# Patient Record
Sex: FEMALE | Race: WHITE | NOT HISPANIC OR LATINO | URBAN - METROPOLITAN AREA
[De-identification: names, ages, dates, MRNs, and addresses within clinical notes are randomized per-mention and may not be internally consistent; named-entity substitution may affect disease eponyms.]

---

## 2017-01-05 ENCOUNTER — OFFICE VISIT - HEALTHEAST (OUTPATIENT)
Dept: BEHAVIORAL HEALTH | Facility: CLINIC | Age: 32
End: 2017-01-05

## 2017-01-05 DIAGNOSIS — F43.23 ADJUSTMENT DISORDER WITH MIXED ANXIETY AND DEPRESSED MOOD: ICD-10-CM

## 2017-01-18 ENCOUNTER — OFFICE VISIT - HEALTHEAST (OUTPATIENT)
Dept: BEHAVIORAL HEALTH | Facility: CLINIC | Age: 32
End: 2017-01-18

## 2017-01-18 DIAGNOSIS — F43.23 ADJUSTMENT DISORDER WITH MIXED ANXIETY AND DEPRESSED MOOD: ICD-10-CM

## 2017-02-03 ENCOUNTER — AMBULATORY - HEALTHEAST (OUTPATIENT)
Dept: BEHAVIORAL HEALTH | Facility: CLINIC | Age: 32
End: 2017-02-03

## 2017-02-03 ENCOUNTER — OFFICE VISIT - HEALTHEAST (OUTPATIENT)
Dept: BEHAVIORAL HEALTH | Facility: CLINIC | Age: 32
End: 2017-02-03

## 2017-02-03 DIAGNOSIS — F43.23 ADJUSTMENT DISORDER WITH MIXED ANXIETY AND DEPRESSED MOOD: ICD-10-CM

## 2017-02-17 ENCOUNTER — OFFICE VISIT - HEALTHEAST (OUTPATIENT)
Dept: BEHAVIORAL HEALTH | Facility: CLINIC | Age: 32
End: 2017-02-17

## 2017-02-17 DIAGNOSIS — F43.23 ADJUSTMENT DISORDER WITH MIXED ANXIETY AND DEPRESSED MOOD: ICD-10-CM

## 2017-03-08 ENCOUNTER — OFFICE VISIT - HEALTHEAST (OUTPATIENT)
Dept: BEHAVIORAL HEALTH | Facility: CLINIC | Age: 32
End: 2017-03-08

## 2017-03-08 DIAGNOSIS — F43.23 ADJUSTMENT DISORDER WITH MIXED ANXIETY AND DEPRESSED MOOD: ICD-10-CM

## 2017-04-11 ENCOUNTER — OFFICE VISIT - HEALTHEAST (OUTPATIENT)
Dept: BEHAVIORAL HEALTH | Facility: CLINIC | Age: 32
End: 2017-04-11

## 2017-04-11 DIAGNOSIS — F43.23 ADJUSTMENT DISORDER WITH MIXED ANXIETY AND DEPRESSED MOOD: ICD-10-CM

## 2017-05-12 ENCOUNTER — OFFICE VISIT - HEALTHEAST (OUTPATIENT)
Dept: BEHAVIORAL HEALTH | Facility: CLINIC | Age: 32
End: 2017-05-12

## 2017-05-12 DIAGNOSIS — F43.23 ADJUSTMENT DISORDER WITH MIXED ANXIETY AND DEPRESSED MOOD: ICD-10-CM

## 2017-05-15 ENCOUNTER — OFFICE VISIT - HEALTHEAST (OUTPATIENT)
Dept: FAMILY MEDICINE | Facility: CLINIC | Age: 32
End: 2017-05-15

## 2017-05-15 ENCOUNTER — HOSPITAL ENCOUNTER (OUTPATIENT)
Dept: LAB | Age: 32
Setting detail: SPECIMEN
Discharge: HOME | End: 2017-05-15

## 2017-05-15 DIAGNOSIS — J32.9 SINUSITIS: ICD-10-CM

## 2017-05-15 DIAGNOSIS — J02.9 SORE THROAT: ICD-10-CM

## 2017-05-15 ASSESSMENT — MIFFLIN-ST. JEOR: SCORE: 2114.96

## 2017-06-01 ENCOUNTER — AMBULATORY - HEALTHEAST (OUTPATIENT)
Dept: BEHAVIORAL HEALTH | Facility: CLINIC | Age: 32
End: 2017-06-01

## 2017-06-01 ENCOUNTER — OFFICE VISIT - HEALTHEAST (OUTPATIENT)
Dept: BEHAVIORAL HEALTH | Facility: CLINIC | Age: 32
End: 2017-06-01

## 2017-06-01 DIAGNOSIS — F43.23 ADJUSTMENT DISORDER WITH MIXED ANXIETY AND DEPRESSED MOOD: ICD-10-CM

## 2017-06-15 ENCOUNTER — OFFICE VISIT - HEALTHEAST (OUTPATIENT)
Dept: BEHAVIORAL HEALTH | Facility: CLINIC | Age: 32
End: 2017-06-15

## 2017-06-15 DIAGNOSIS — F43.23 ADJUSTMENT DISORDER WITH MIXED ANXIETY AND DEPRESSED MOOD: ICD-10-CM

## 2017-07-21 ENCOUNTER — OFFICE VISIT - HEALTHEAST (OUTPATIENT)
Dept: BEHAVIORAL HEALTH | Facility: CLINIC | Age: 32
End: 2017-07-21

## 2017-07-21 DIAGNOSIS — F43.23 ADJUSTMENT DISORDER WITH MIXED ANXIETY AND DEPRESSED MOOD: ICD-10-CM

## 2017-08-24 ENCOUNTER — OFFICE VISIT - HEALTHEAST (OUTPATIENT)
Dept: BEHAVIORAL HEALTH | Facility: CLINIC | Age: 32
End: 2017-08-24

## 2017-08-24 DIAGNOSIS — F43.23 ADJUSTMENT DISORDER WITH MIXED ANXIETY AND DEPRESSED MOOD: ICD-10-CM

## 2017-09-25 ENCOUNTER — OFFICE VISIT - HEALTHEAST (OUTPATIENT)
Dept: FAMILY MEDICINE | Facility: CLINIC | Age: 32
End: 2017-09-25

## 2017-09-25 DIAGNOSIS — J02.0 STREP THROAT: ICD-10-CM

## 2017-09-28 ENCOUNTER — OFFICE VISIT - HEALTHEAST (OUTPATIENT)
Dept: BEHAVIORAL HEALTH | Facility: CLINIC | Age: 32
End: 2017-09-28

## 2017-09-28 ENCOUNTER — AMBULATORY - HEALTHEAST (OUTPATIENT)
Dept: BEHAVIORAL HEALTH | Facility: CLINIC | Age: 32
End: 2017-09-28

## 2017-09-28 DIAGNOSIS — F43.23 ADJUSTMENT DISORDER WITH MIXED ANXIETY AND DEPRESSED MOOD: ICD-10-CM

## 2017-10-26 ENCOUNTER — OFFICE VISIT - HEALTHEAST (OUTPATIENT)
Dept: BEHAVIORAL HEALTH | Facility: CLINIC | Age: 32
End: 2017-10-26

## 2017-10-26 DIAGNOSIS — F43.23 ADJUSTMENT DISORDER WITH MIXED ANXIETY AND DEPRESSED MOOD: ICD-10-CM

## 2017-11-30 ENCOUNTER — OFFICE VISIT - HEALTHEAST (OUTPATIENT)
Dept: BEHAVIORAL HEALTH | Facility: CLINIC | Age: 32
End: 2017-11-30

## 2017-11-30 DIAGNOSIS — F43.23 ADJUSTMENT DISORDER WITH MIXED ANXIETY AND DEPRESSED MOOD: ICD-10-CM

## 2018-01-11 ENCOUNTER — AMBULATORY - HEALTHEAST (OUTPATIENT)
Dept: BEHAVIORAL HEALTH | Facility: CLINIC | Age: 33
End: 2018-01-11

## 2018-01-11 ENCOUNTER — OFFICE VISIT - HEALTHEAST (OUTPATIENT)
Dept: BEHAVIORAL HEALTH | Facility: CLINIC | Age: 33
End: 2018-01-11

## 2018-01-11 DIAGNOSIS — F43.23 ADJUSTMENT DISORDER WITH MIXED ANXIETY AND DEPRESSED MOOD: ICD-10-CM

## 2018-02-15 ENCOUNTER — OFFICE VISIT - HEALTHEAST (OUTPATIENT)
Dept: BEHAVIORAL HEALTH | Facility: CLINIC | Age: 33
End: 2018-02-15

## 2018-02-15 DIAGNOSIS — F43.23 ADJUSTMENT DISORDER WITH MIXED ANXIETY AND DEPRESSED MOOD: ICD-10-CM

## 2018-03-22 ENCOUNTER — OFFICE VISIT - HEALTHEAST (OUTPATIENT)
Dept: BEHAVIORAL HEALTH | Facility: CLINIC | Age: 33
End: 2018-03-22

## 2018-03-22 DIAGNOSIS — F43.23 ADJUSTMENT DISORDER WITH MIXED ANXIETY AND DEPRESSED MOOD: ICD-10-CM

## 2018-04-19 ENCOUNTER — OFFICE VISIT - HEALTHEAST (OUTPATIENT)
Dept: BEHAVIORAL HEALTH | Facility: CLINIC | Age: 33
End: 2018-04-19

## 2018-04-19 DIAGNOSIS — F43.23 ADJUSTMENT DISORDER WITH MIXED ANXIETY AND DEPRESSED MOOD: ICD-10-CM

## 2018-05-22 ENCOUNTER — AMBULATORY - HEALTHEAST (OUTPATIENT)
Dept: BEHAVIORAL HEALTH | Facility: CLINIC | Age: 33
End: 2018-05-22

## 2018-05-22 ENCOUNTER — OFFICE VISIT - HEALTHEAST (OUTPATIENT)
Dept: BEHAVIORAL HEALTH | Facility: CLINIC | Age: 33
End: 2018-05-22

## 2018-05-22 DIAGNOSIS — F43.23 ADJUSTMENT DISORDER WITH MIXED ANXIETY AND DEPRESSED MOOD: ICD-10-CM

## 2021-05-30 VITALS — WEIGHT: 293 LBS | HEIGHT: 68 IN | BODY MASS INDEX: 44.41 KG/M2

## 2021-05-31 VITALS — WEIGHT: 293 LBS | BODY MASS INDEX: 46.76 KG/M2

## 2021-06-08 NOTE — PROGRESS NOTES
"Diagnostic Assessment  [] Brief  [x] Standard    Date(s): 2017  Start Time: 9:12 AM  Stop Time: 10:49 AM    Patient Name: Tanna Live  Age: 31 y.o.    1985        Referral Source: Vero Rudolph CNP  Therapist: BOGDAN Pope        Persons Present: Tanna Live and BOGDAN Pope    Chief Complaint   \"Last spring my brother was murdered.\"    Patient s expectation for treatment  \"I want to try to be less emotional.\"    Sources/references used in completing this assessment:   Face-to-face interview, Patient chart, adult intake questionnaire, and psychological measures listed below:    Psychological Measures:  1. PANSI: Positive ideation score=3.83 <3.4; Negative ideation score= 1>1.6.  Patient denies suicidal thoughts and/or planning and commits to seeking safety if her is unsafe in the community.    2. CAGE Aid= score of 1/4  Patient is not enrolled in chemical dependency program and denies substance use problem; no referral made at this time.  3.  WHODAS: 99, representing a disability percentage of 18.75%.  H1=5, H2&3=0  4. PHQ-9=score of 9 Patient indicates it is somewhat difficult to manage symptoms.  5. CARLYN-7=score of 9 Patient indicates it is somewhat difficult to manage symptoms.    Presenting Problem/History:    Functional Impairments:   Personal: 3  Family: 3  Work: 3  Social:2     How does the presenting problem affect patients daily functioning:    Feels \"outside of it\" and/or \"off\", cries a lot, sometimes can't stop crying, sadness, feels a loss and unable to fix it.    Issues/Stressors:   Brother's disappearance in 2016 and confirmed death in 2016, feels like she should move back to her hometown to be closer to her family to offer more support and to facilitate their questions with the 's investigation, feels there has been a miscarriage of justice, grief & loss, stressful job, her late brother's former girlfriend/mother to her 7-year old " "niece is \"trashing\" her family on Facebook.      Physical Problems: Weight gain, Inability to sleep and Sleeping too much    Social Problems: Reported none.    Behavioral Problems: Excessive work and Subtance abuse    Cognitive Problems: Distractability, Indecisiveness and Disordered thinking    Emotional Problems: Anxious, Angry and Mood swings     Onset/Frequency/Duration presenting problem symptoms:    2016.    How does the patient perceive his/her problem in relation to how others see his/her problem?  She describes a very supportive family and boyfriend who reflect/share understanding.  Her boyfriend encouraged her to seek therapy.    Family/Social History:     Marriages/Significant other   In relationship with a boyfriend of 7 months.  She describes him as \"very supportive.\"    Children   Reported none.    Parents   Her parents are  for 39 years.  Her mother is a nurse and her father is a retired perez.  She describes being close with her parents.      Siblings  Her older brother  at age 45 this past year.  He her biological half-brother, from her father's first marriage.  She has an older sister, age 37.  She is close to her sister, and was also close to her brother.    Climate in family of origin   \"Warm, wonderful, excellent.  We are very close.\"    Education  Graduated from a college preparatory school.  Attended culinary school and is trained as a .    Problems with Learning or School   Reported none.    Developmental factors   Reported meeting all of her developmental milestones.    Significant personal relationships including patient s evaluation of the relationship quality  She describes being very close with her family.  Also, she identifies her boyfriend as \"very supportive.\"  She has long-standing friendships, as well.    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  She lived in a tent during these months.      Sexual/physical/emotional/financial abuse/trauma  Reported none.    Contextual Non-personal factors contributing to the patients concerns   Reported none.    Strengths/personal resources  Direct, intuitive, cares for others, and advocate for loved ones, treated, intelligent, skilled in leadership, guided by a strong sense of fairness.    Weaknesses   Can be very tough on self, high expectations on self.    Support network(s)/Resources  Reported none.    Belief system:    Druze.    Cultural influences and impact on patient  She [place] the [place]. She reflected that her mother is very stoic, and that her parents are of [place] descent. She identifies family/ethnic cultural messages of \"don't get bogged down by the snowstorm\" and that it is important to \"push through inner turmoil.\"    Cultural impact on health and health care   Seeks and adheres to standard [facility] medical care.    Current living situation   He lives in an apartment in an urban residential neighborhood which she describes as safe and comfortable.    Work History  She works in the [employer] business as a , a position she has had for over 2 years.  She also does catering as self employment.    Financial Concerns   Reported none. Has student loans.    Legal Problems   Reported none.    Hobbies/Interests:    Being outside, doing art, being with family and friends      Family Mental Health/Medical History    Family Mental Health:    Reported none.    Family history of Suicide:  Reported none.    Family history Chemical Dependency:    Substance abuse - brother.    Family Medical history:   Reported none.      Patient Medical History    Hospitalizations     Was hospitalized for 2 days in 2012 for dehydration.    Medical diagnoses/concerns:  Reported none.    Current physician/other non psychiatric medical provider s: "    Vero Rudolph CNP                     Date of last medical exam:   December 12, 2016    Current Medications:  Current Outpatient Prescriptions   Medication Sig Dispense Refill     triamcinolone (KENALOG) 0.5 % cream Apply twice a day to affected areas 30 g 0     valACYclovir (VALTREX) 500 MG tablet Take 500 mg by mouth 2 (two) times a day.       No current facility-administered medications for this visit.          Past Mental Health History:    Previous mental health diagnosis:  Reported none.    Date of diagnosis:  Reported none.    Hx of Mental Health Treatment or Services:  Reported none.    BERRY Received:      [] Yes   [x] No      Hx of MH Tx/Hospitalizations   Reported none.    Hx of Psychiatric Medications:  Reported none.      Suicidal/Homicidal Risk Assessment:    Suicidal: None reported  Ideation:None reported.  History of Past Attempt(s): description: Reported none.  Crisis Plan: Crisis plan not developed as patient denies symptoms.    Homicidal: None reported   Ideation:None reported.  History of Aggression towards others: Reported none.  Crisis Plan: Crisis plan not developed as patient denies symptoms.    Self-Injuring Behaviors: Reported none.  History of SIB: Reported none.  Crisis Plan: Crisis plan not developed as patient denies symptoms.    History of destruction to property: Reported none.  Description: Reported none.  Crisis Plan: Crisis plan not developed as patient denies symptoms.      Non- Substance Abuse addictive Behaviors/Compulsive Behaviors:  [] Gambling     [] Sex     [] Pornography    [] Shopping     [] Eating     [] Self-Injury  [] Other           [x] None Reported      Comments:   Reported none.      Chemical Use/Abuse History    CAGE-AID (screening to determine a patients use/abuse/dependency): 1/4      Alcohol:   [] None Reported    [x] Yes   [] No  Type: Beer, wine, or Champagne   Frequency (daily, weekly, occasionally): weekly  Age of first use: 19    Date of last use:  1/1/17          Street Drugs:   [] None Reported    [x] Yes   [] No  Type:marijuana   Frequency (daily, weekly, occasionally): weekly, a small amount  Age of first use: 23    Date of last use: 1/1/17    Prescription Drugs:   [x] None Reported    [] Yes   [] No  Type: Reported none.   Frequency (daily, weekly, occasionally): Reported none.    Tobacco:   [] None Reported    [x] Yes   [] No  Type: Cigarettes  Frequency (daily, weekly, occasionally): Occasionally, identifies as a social smoker.  Age of first use: 23    Date of last use: 1/1/17    Caffeine:   [x] None Reported    [] Yes   [] No  Type: Reported none.   Frequency (daily, weekly, occasionally): Reported none.    Currently in a treatment program:   [] Yes   [x] No      Where: No history of treatment program.    BERRY Received:    [] Yes   [x] No         Collaborative info requested/received:   [] Yes   [x] No      Comments: No history of chemical health problems nor treatment.    History of CD Treatment:      [x] None Reported             Description: See above.    MENTAL STATUS EVALUATION    Grooming: Well groomed  Attire: Appropriate  Age: Appears Stated  Behavior Towards Examiner: Cooperative  Motor Activity: Within normal   Eye Contact: Appropriate  Mood: Sad  Affect: Congruent w/content of speech and Tearful  Speech/Language: Within normal  Attention: Within normal  Concentration: Within normal  Thought Process: Within normal  Thought Content: Hallucinations: Within noraml  Delusions: Within normal  Orientation: X 3  Memory: No Evidence of Impairment  Judgment: No Evidence of Impairment  Estimated Intelligence: Above Average  Demonstrated Insight: Adequate  Fund of Knowledge: adequate      Clinical Impressions/Assessment/Recommendations:     Tanna Live provided background information via the Adult Intake Questionnaire, psychological measures (scores are documented at the beginning of this DA), and face-to-face interview.  [facility]  "[facility] to complete this DA.  The patient was referred to this therapist by Vero Rudolph CNP.      Tanna Live is a pleasant 31 y.o. White or  female presenting to therapy for assistance with grief following her brother's death.  The patient advises her desired outcomes of therapy are \"to not be so emotional\".  Tanna Live indicates her difficulties with sadness and grief began June 2016 when her brother who had been missing since March 2016 was found dead [place] a [place].  The patient has attempted to eliminate or manage these problems by connecting to her family, and experiential avoidance.  The patient reports being unsuccessful in managing her symptoms of depression and anxiety, and expresses an interest in engaging in psychotherapy at this time.      Social stressors: Brother's disappearance in March 2016 and confirmed death in June 2016, living at a distance from her family, dissatisfaction with investigation of her brother's death, her late brother's former girlfriend/mother to her 7-year old niece \"trashing\" of her family on Facebook, stressful job. The patient presents important strengths of intelligence, supportive family, close network of friends, stable employment in her field, leadership skills, value of spiritual britney.     Based on the information gathered in this diagnostic assessment, the patient meets criteria for the following DSM-5 diagnosis, Adjustment Disorder with mixed anxiety and depressed mood, persistent (chronic) given her experience of the development of emotional symptoms in response to the presence of identifiable stressors occurring within 3 months of the onset of the stressors and lasting for over 6 months. Theses symptoms are clinically significant, evidenced by the following: marked distress that is experienced as feeling on a daily basis, worrying about different things, trouble relaxing, fatigue, irritability, indecisiveness, and sleep disturbance. The " stress-related disturbance does not meet the criteria for another mental disorder and is not merely an exacerbation of a preexisting mental disorder, nor does it appear to represent normal bereavement.     Diagnosis:     Adjustment Disorder with mixed anxiety and depressed mood      It is recommended that the patient begin individual psychotherapy with follow-up appointments scheduled weekly or biweekly.      Tanna Live would be best serviced by therapeutic interventions that provide a client centered atmosphere with positive regard.  In addition, the patient may benefit from cognitive behavioral therapies, along with Motivational Interviewing.      Assessment of client resolving presenting mental health concerns:  Ability  [] low     [] average     [x] high  Motivation [] low     [] average     [x] high  Willingness [] low     [] average     [x] high    Initial Therapy Plan     1.. Return to therapy to discuss outcome of diagnostic assessment and create a treatment plan.      2. Develop therapeutic relationship with therapist.      3. CBT and Mindfulness-based approaches, such as ACT for anxiety and depression.    4. Grief counseling, to include connection to other grief support resources, such as therapy groups in the community.      4. Motivational Interviewing to increase client's therapeutic engagement and evoke motivation to make positive change.       Is patient's family involved in the treatment?  [x] No     [] Yes    If yes, How?  Patient is pursuing individual treatment sessions at this time.    If no, Why?  Patient's preference is for individual treatment sessions. Should she want to include a loved one, her change in preference will be honored.      Therapist s Signature:   Performed and documented by MANUEL Colon, [facility].

## 2021-06-08 NOTE — PROGRESS NOTES
"Mental Health Visit Note    2/3/2017    Start time: 1:05 PM    Stop Time: 2:06 PM   Session # 2    Tanna Live is a 31 y.o. female is being seen today for    Chief Complaint   Patient presents with     Follow-up   .     New symptoms or complaints: None    Functional Impairment:   Personal: 4  Family: 2  Work: 4  Social:2    Clinical assessment of mental status: Tanna presented on time for her appointment. She was oriented x3, open and cooperative, and dressed appropriately for this session and weather. Her memory was Normal cognitive functioning . Her speech was within normal. Language was appropriate to discussion. Concentration and focus is within normal. Psychosis is not noted nor reported. Her mood is dysphoric. Affect is congruent with mood, tearful at times, appropriate to subject. Fund of knowledge is adequate. Insight is adequate for therapy.      Suicidal/Homicidal Ideation present: None Reported This Session    Patient's impression of their current status: Patient stated, \"My landlord has given me a 60 day notice to move.  I'm really heartbroken.\"  She really likes where she lives and reflected on how it has been a source of comfort and stability for over 8 years.  She described feeling overwhelmed at the thought of moving at this time, as she continues to grieve her brother's death.  She reflected that she resists feeling her sadness because \"I'm afraid I won't be able to stop crying.\"  She is planning to go on a short vacation to [place] with her girlfriends next week and considered that it will be good to be with her friends.      Therapist impression of patients current state: Patient is engaging in the therapeutic process. Her thoughts, feelings, and beliefs were processed. She is willing to explore strategies for improved symptom management, and was willing to look at the components of wellbeing, particularly fusion/defusion from unhelpful thoughts. She is insightful.    Type of " "psychotherapeutic technique provided: Insight oriented, Client centered, Solution-focused, CBT and grief support    Progress toward short term goals:Progress as expected, patient is receptive to grief support and strategies for symptom management/improvement as she is in an important life adjustment.    Review of long term goals: Treatment Plan completed.    Diagnosis:   1. Adjustment disorder with mixed anxiety and depressed mood        Plan and Follow up: Patient encouraged to maintain healthy routines. Patient agreed to practice noticing her thoughts to build capacity to recognize thought patterns that are not helpful and awareness of thought-feeling connection while identifying and committing to behaviors that improve her mood. Encouraged patient to practice a daily mindfulness breathing exercise.She plans to try using defusion strategies when she notices she is getting stuck in a thought or feeling, to say to herself, \"I am having the thought/feeling that...\" and/or to sing aloud her thoughts, when comfortable, in addition to continuing healthy daily routines.  Provided information about connecting to a grief support group.  Plans to return for follow up in two weeks after returning from her trip.       Discharge Criteria/Planning: Patient will continue with follow-up until therapy can be discontinued without return of signs and symptoms.    Carrie Contreras 2/3/2017      This note was created with help of Dragon dictation software. Grammatical / typing errors are not intentional and inherent to the software.  "

## 2021-06-08 NOTE — PROGRESS NOTES
Mental Health Visit Note    1/18/2017    Start time: 11:11 AM    Stop Time: 12:12 PM   Session # 2    Tanna Live is a 31 y.o. female is being seen today for    Chief Complaint   Patient presents with     Follow-up   .     New symptoms or complaints: None    Functional Impairment:   Personal: 4  Family: 3  Work: 4  Social:2    Clinical assessment of mental status: Tanna presented on time for her appointment. She was oriented x3, open and cooperative, and dressed appropriately for this session and weather. Her memory was Normal cognitive functioning . Her speech was within normal. Language was appropriate to discussion. Concentration and focus is within normal. Psychosis is not noted nor reported. Her mood is dysphoric. Affect is congruent with mood, tearful at times, appropriate to subject. Fund of knowledge is adequate. Insight is adequate for therapy.      Suicidal/Homicidal Ideation present: None Reported This Session    Patient's impression of their current status: Patient discussed occupational stressors.  She frequently works long hours and in a setting where it is under staffed.  She faces some unique stressors at work where the owner of the company who is from her home town area will ask her about her brother's death and the ensuing legal matters.  She continues to grieve his death and reflected on the instant life perspective that this loss brings.  She is considering an eventual move to be near her parents and other family.    Therapist impression of patients current state: Patient is engaging in the therapeutic process. Her thoughts, feelings, and beliefs were processed. She is willing to explore strategies for improved symptom management, and was willing to look at the components of wellbeing, particularly fusion/defusion from unhelpful thoughts. She is insightful.    Type of psychotherapeutic technique provided: Insight oriented, Client centered, Solution-focused, CBT and grief  "support    Progress toward short term goals:Progress as expected, patient is receptive to grief support and strategies for symptom management/improvement as she is in an important life adjustment.    Review of long term goals: Treatment Plan will be completed at next visit.    Diagnosis:   1. Adjustment disorder with mixed anxiety and depressed mood        Plan and Follow up: Patient encouraged to maintain healthy routines. Patient agreed to practice noticing her thoughts to build capacity to recognize thought patterns that are not helpful and awareness of thought-feeling connection while identifying and committing to behaviors that improve her mood. Encouraged patient to practice a daily mindfulness breathing exercise.She plans to use a defusion strategy when she notices she is getting stuck in a thought or feeling, to say to herself, \"I am having the thought/feeling that...\" in addition to continuing healthy daily routines.  Provided information about connecting to a grief support group.  Plans to return for follow up next week.       Discharge Criteria/Planning: Patient will continue with follow-up until therapy can be discontinued without return of signs and symptoms.    Carrie Contreras 1/18/2017      This note was created with help of Dragon dictation software. Grammatical / typing errors are not intentional and inherent to the software.  "

## 2021-06-08 NOTE — PROGRESS NOTES
"Mental Health Visit Note    2/17/2017    Start time: 1:04 PM    Stop Time: 2:05 PM   Session # 3    Tanna Live is a 31 y.o. female is being seen today for    Chief Complaint   Patient presents with     Follow-up     Anxiety     Depression   .     New symptoms or complaints: None    Functional Impairment:   Personal: 4  Family: 3  Work: 2  Social:2    Clinical assessment of mental status: Tanna presented on time for her appointment. She was oriented x3, open and cooperative, and dressed appropriately for this session and weather. Her memory was Normal cognitive functioning . Her speech was within normal. Language was appropriate to discussion. Concentration and focus is within normal. Psychosis is not noted nor reported. Her mood is dysphoric. Affect is congruent with mood, tearful at times, appropriate to subject. Fund of knowledge is adequate. Insight is adequate for therapy.      Suicidal/Homicidal Ideation present: None Reported This Session    Patient's impression of their current status: Patient stated, \"Another big week. While I went to [place], my grandma was diagnosed with cancer, and is home on hospice.\"  She drove to [place] to spend some time with her grandmother as soon as she came home from her trip with her girlfriends, something that had been arranged months in advance to celebrate several of them having turned or soon to turn 30.  The patient reflected that she had one day during her vacation where she had an anxiety attack, described as \"not panic, feeling really anxious and uninterested in joining my friends in activities\" that particular day.  Within hours, she learned about her grandmother's diagnosis and prognosis.  She reflected on the difference between this experience and the death of her brother.  She considered the important role her grandmother has played in her life and the closeness she feels with her.      Therapist impression of patients current state: Patient is engaging in the " therapeutic process. Her thoughts, feelings, and beliefs were processed. She is willing to explore strategies for improved symptom management, and was willing to look at the components of wellbeing, particularly fusion/defusion from unhelpful thoughts. She is insightful.    Type of psychotherapeutic technique provided: Insight oriented, Client centered, Solution-focused, CBT and grief support    Progress toward short term goals:Progress as expected, patient is receptive to grief support and strategies for symptom management/improvement as she is in an important life adjustment.    Review of long term goals: Patient is making progress on her long-term goals.      Diagnosis:   1. Adjustment disorder with mixed anxiety and depressed mood        Plan and Follow up: Patient encouraged to be honoring of herself - seek out her support system, show herself expert care. Patient plans to continue to practice noticing her thoughts to build capacity to recognize thought patterns that are not helpful and awareness of thought-feeling connection while identifying and committing to behaviors that improve her mood. Encouraged patient to practice a daily mindfulness breathing exercise.She plans to try using defusion strategies in the presence of unhelpful thoughts.  Patient has information from previous session on grief support groups in the community.  Plans to return for follow up in two weeks.       Discharge Criteria/Planning: Patient will continue with follow-up until therapy can be discontinued without return of signs and symptoms.    Carrie Contreras 2/17/2017      This note was created with help of Dragon dictation software. Grammatical / typing errors are not intentional and inherent to the software.

## 2021-06-08 NOTE — PROGRESS NOTES
Outpatient Mental Health Treatment Plan    Name:  Tanna Live  :  1985  MRN:  [id number]    Treatment Plan:  Initial Treatment Plan  Intake/initial treatment plan date:  2/3/17  Benefit and risks and alternatives have been discussed: Yes  Is this treatment appropriate with minimal intrusion/restrictions: Yes  Estimated duration of treatment:  Approximately 10-12 sessions.  Anticipated frequency of services:  Every 2 weeks  Necessity for frequency: This frequency is needed to establish therapeutic goals and for continuity of care in order to monitor progress.  Necessity for treatment: To address cognitive, behavioral, and/or emotional barriers in order to work toward goals and to improve quality of life.    Plan:      ? Depression    Goal:  Decrease average depression level from 3/4 to 1/4.   Strategies:    ?[x] Decrease social isolation     [x] Increase involvement in meaningful activities     ?[x] Discuss sleep hygiene     ?[x] Explore thoughts and expectations about self and others     ?[x] Process grief (loss of significant person, independence, role, etc.)     ?[x] Assess for suicide risk     ?[x] Implement physical activity routine (with physician approval)     [x] Consider introduction of bibliotherapy and/or videos     [x] Continue compliance with medical treatment plan (or explore barriers)       ?  ?Degree to which this is a problem: 2  Degree to which goal is met: goal established this session  Date of Review: in 3 months.          ?   ? Anxiety    Goal:  Decrease average anxiety level from 4/4 to 1/4.   Strategies: ? [x]Learn and practice relaxation techniques and other coping strategies (e.g., thought stopping, reframing, meditation)     ? [x] Increase involvement in meaningful activities     ? [x] Discuss sleep hygiene     ? [x] Explore thoughts and expectations about self and others     ? [x] Identify and monitor triggers for panic/anxiety symptoms     ? [x] Implement physical activity  "routine (with physician approval)     ? [x] Consider introduction of bibliotherapy and/or videos     ? [x] Continue compliance with medical treatment plan (or explore barriers)                                       []     Degree to which this is a problem: 4  Degree to which goal is met: goal established this session  Date of Review: in 3 months         Functional Impairment:  1=Not at all/Rarely  2=Some days  3=Most Days  4=Every Day    Personal : 3  Family : 1  Social : 2   Work/school : 3    Diagnosis:  Adjustment Disorder with mixed anxiety and depressed mood      WHODAS 2.0 12-item version  WHODAS: 9    Scores presented in qualifiers to represent level of disability.  MILD Problem (slight, low, ...) 5-24%    H1= 5  H2= 0  H3= 0    Clinical assessments and measures completed:. CARLYN-7, PHQ-9, CAGE-AID and PANSI     Strengths:  Tanna is intelligent,intuitive, direct, caring, advocates for loved ones, has natural leadership skills, and is guided in life by a strong sense of fairness.  Limitations:  Tanna can be very tough on self, including having high expectations for herself.  Cultural Considerations: She grew up the youngest of three siblings in [place]. She identifies family/ethnic cultural messages of \"don't get bogged down by the snowstorm\" and that it is important to \"push through inner turmoil.\"    Persons responsible for this plan: Patient            Psychotherapist Signature           Patient Signature:              Guardian Signature             Provider: Performed and documented by BOGDAN Pope   Date:  2/3/2017      This note was created with help of Dragon dictation software.  Grammatical / typing errors are not intentional and inherent to the software.    "

## 2021-06-09 NOTE — PROGRESS NOTES
Mental Health Visit Note    3/8/2017    Start time: 1:02 PM    Stop Time: 2:01 PM   Session # 4    Tanna Live is a 31 y.o. female is being seen today for    Chief Complaint   Patient presents with     Follow-up     Anxiety     Depression   .     New symptoms or complaints: None    Functional Impairment:   Personal: 3  Family: 4  Work: 2  Social:2    Clinical assessment of mental status: Tanna presented on time for her appointment. She was oriented x3, open and cooperative, and dressed appropriately for this session and weather. Her memory was Normal cognitive functioning . Her speech was within normal. Language was appropriate to discussion. Concentration and focus is within normal. Psychosis is not noted nor reported. Her mood is dysphoric. Affect is congruent with mood, tearful at times, appropriate to subject. Fund of knowledge is adequate. Insight is adequate for therapy.      Suicidal/Homicidal Ideation present: None Reported This Session    Patient's impression of their current status: Patient shared that her grandmother  and reflected on her .  She reflected further on the role her grandmother has played in her life.  She discussed inter-generational family trauma and resilience.  She reflected on the values that are most important to her in life and the behaviors that support them.     Therapist impression of patients current state: Patient is engaging in the therapeutic process. Her thoughts, feelings, and beliefs were processed. She is willing to explore strategies for improved symptom management, and was willing to look at the components of wellbeing, particularly fusion/defusion from unhelpful thoughts. She is insightful.    Type of psychotherapeutic technique provided: Insight oriented, Client centered, Solution-focused, CBT and grief support    Progress toward short term goals:Progress as expected, patient is receptive to grief support and strategies for symptom  management/improvement as she is in an important life adjustment.    Review of long term goals: Patient is making progress on her long-term goals.      Diagnosis:   1. Adjustment disorder with mixed anxiety and depressed mood        Plan and Follow up: Patient encouraged to be honoring of herself - seek out her support system, show herself expert care. Patient plans to continue to practice noticing her thoughts to build capacity to recognize thought patterns that are not helpful and awareness of thought-feeling connection while identifying and committing to behaviors that improve her mood. Encouraged patient to practice a daily mindfulness breathing exercise.She plans to try using defusion strategies in the presence of unhelpful thoughts.  Patient has information from previous session on grief support groups in the community.  Plans to return for follow up in two weeks.       Discharge Criteria/Planning: Patient will continue with follow-up until therapy can be discontinued without return of signs and symptoms.    Carrie Contreras 3/8/2017      This note was created with help of Dragon dictation software. Grammatical / typing errors are not intentional and inherent to the software.

## 2021-06-10 NOTE — PROGRESS NOTES
"Mental Health Visit Note    5/12/2017    Start time: 10:07 AM    Stop Time: 11:06 AM   Session # 6    Tanna Live is a 32 y.o. female is being seen today for    Chief Complaint   Patient presents with     Follow-up     Depression     Anxiety   .     New symptoms or complaints: None    Functional Impairment:   Personal: 4  Family: 2  Work: 2  Social:2    Clinical assessment of mental status: Tanna presented on time for her appointment. She was oriented x3, open and cooperative, and dressed appropriately for this session and weather. Her memory was Normal cognitive functioning . Her speech was within normal. Language was appropriate to discussion. Concentration and focus is within normal. Psychosis is not noted nor reported. Her mood is dysphoric. Affect is congruent with mood, tearful at times, appropriate to subject. Fund of knowledge is adequate. Insight is adequate for therapy.      Suicidal/Homicidal Ideation present: None Reported This Session    Patient's impression of their current status: Patient stated, \"I think I'm doing OK.  I spent my birthday crying.\"  She used to hear from her brother on her birthday whose birthday was very close to hers.  She reflected on her grief and the idea that there will be a lot of first anniversaries this year since his death last year.  She reflected on the values that are most meaningful to her, including family, connection with others, and personal growth.    Therapist impression of patients current state: Patient is engaging in the therapeutic process. Her thoughts, feelings, and beliefs were processed. She is willing to explore strategies for improved symptom management, and was willing to look at the components of wellbeing, particularly fusion/defusion from unhelpful thoughts. She is insightful.    Type of psychotherapeutic technique provided: Insight oriented, Client centered, Solution-focused, CBT and grief support    Progress toward short term goals:Progress " as expected, patient is receptive to grief support and strategies for symptom management/improvement as she is in an important life adjustment.    Review of long term goals: Patient is making progress on her long-term goals.      Diagnosis:   1. Adjustment disorder with mixed anxiety and depressed mood        Plan and Follow up: Patient encouraged to be honoring of herself - seek out her support system, show herself expert care. Patient plans to continue to practice noticing her thoughts to build capacity to recognize thought patterns that are not helpful and awareness of thought-feeling connection while identifying and committing to behaviors that improve her mood. Encouraged patient to continue to nurture a daily mindfulness breathing exercise. She plans to try using defusion strategies in the presence of unhelpful thoughts.  Patient has information from previous session on grief support groups in the community.  Plans to return for follow up in two weeks.       Discharge Criteria/Planning: Patient will continue with follow-up until therapy can be discontinued without return of signs and symptoms.    Carrie Contreras 5/12/2017      This note was created with help of Dragon dictation software. Grammatical / typing errors are not intentional and inherent to the software.

## 2021-06-10 NOTE — PROGRESS NOTES
"Mental Health Visit Note    4/11/2017    Start time: 1:10 PM    Stop Time: 2:09 PM   Session # 5    Tanna Live is a 31 y.o. female is being seen today for    Chief Complaint   Patient presents with     Follow-up     Depression     Anxiety   .     New symptoms or complaints: None    Functional Impairment:   Personal: 3  Family: 2  Work: 2  Social:2    Clinical assessment of mental status: Tanna presented on time for her appointment. She was oriented x3, open and cooperative, and dressed appropriately for this session and weather. Her memory was Normal cognitive functioning . Her speech was within normal. Language was appropriate to discussion. Concentration and focus is within normal. Psychosis is not noted nor reported. Her mood is dysphoric. Affect is congruent with mood, tearful at times, appropriate to subject. Fund of knowledge is adequate. Insight is adequate for therapy.      Suicidal/Homicidal Ideation present: None Reported This Session    Patient's impression of their current status: Patient stated, \"I've been good for the most part.  There's something that is starting to feel better for me.\"  She noted that an important year anniversary happened at the end of last month, marking the day that her brother disappeared, preceding knowledge of his death.  She reflected on her grandmother's recent death and the close relationship that her grandmother had with her brother, something that brings some sense of comfort at this time.  She discussed concerns about her boyfriend's problematic substance use behavior. She discussed inter-generational family trauma and resilience.  She reflected on the values that are most important to her in life and the behaviors that support them.     Therapist impression of patients current state: Patient is engaging in the therapeutic process. Her thoughts, feelings, and beliefs were processed. She is willing to explore strategies for improved symptom management, and was " willing to look at the components of wellbeing, particularly fusion/defusion from unhelpful thoughts. She is insightful.    Type of psychotherapeutic technique provided: Insight oriented, Client centered, Solution-focused, CBT and grief support    Progress toward short term goals:Progress as expected, patient is receptive to grief support and strategies for symptom management/improvement as she is in an important life adjustment.    Review of long term goals: Patient is making progress on her long-term goals.      Diagnosis:   1. Adjustment disorder with mixed anxiety and depressed mood        Plan and Follow up: Patient encouraged to be honoring of herself - seek out her support system, show herself expert care. Patient plans to continue to practice noticing her thoughts to build capacity to recognize thought patterns that are not helpful and awareness of thought-feeling connection while identifying and committing to behaviors that improve her mood. Encouraged patient to continue to nurture a daily mindfulness breathing exercise. She plans to try using defusion strategies in the presence of unhelpful thoughts.  Patient has information from previous session on grief support groups in the community.  Plans to return for follow up in two weeks.       Discharge Criteria/Planning: Patient will continue with follow-up until therapy can be discontinued without return of signs and symptoms.    Carrie Contreras 4/11/2017      This note was created with help of Dragon dictation software. Grammatical / typing errors are not intentional and inherent to the software.

## 2021-06-10 NOTE — PROGRESS NOTES
Assessment/Plan:        Diagnoses and all orders for this visit:    Sore throat  -     Rapid Strep A Screen-Throat  -     Group A Strep, RNA Direct Detection, Throat    Sinusitis    Other orders  -     amoxicillin-clavulanate (AUGMENTIN) 500-125 mg per tablet; Take 1 tablet (500 mg total) by mouth 2 (two) times a day for 10 days.  Dispense: 20 tablet; Refill: 0            Subjective:    Patient ID: Tanna Live is a 32 y.o. female.    Sinusitis   This is a new problem. The current episode started in the past 7 days. The problem is unchanged. There has been no fever. Her pain is at a severity of 5/10. The pain is moderate. Associated symptoms include congestion, coughing, sinus pressure and a sore throat. Pertinent negatives include no chills, ear pain, headaches, hoarse voice, shortness of breath, sneezing or swollen glands. Past treatments include acetaminophen. The treatment provided no relief.   Sore Throat    This is a new problem. The current episode started in the past 7 days. The problem has been unchanged. Neither side of throat is experiencing more pain than the other. There has been no fever. The pain is at a severity of 5/10. The pain is moderate. Associated symptoms include congestion and coughing. Pertinent negatives include no ear discharge, ear pain, headaches, hoarse voice, plugged ear sensation, shortness of breath or swollen glands. She has had no exposure to strep. She has tried acetaminophen for the symptoms. The treatment provided no relief.       The following portions of the patient's history were reviewed and updated as appropriate: allergies, current medications, past family history, past medical history, past social history, past surgical history and problem list.    Review of Systems   Constitutional: Positive for fatigue. Negative for appetite change, chills and fever.   HENT: Positive for congestion, postnasal drip, sinus pressure and sore throat. Negative for ear discharge, ear  pain, facial swelling, hoarse voice, rhinorrhea and sneezing.    Respiratory: Positive for cough. Negative for shortness of breath and wheezing.    Neurological: Negative for headaches.   All other systems reviewed and are negative.            Objective:    Physical Exam   Constitutional: She is oriented to person, place, and time. She appears well-developed and well-nourished. No distress.   HENT:   Head: Normocephalic and atraumatic.   Right Ear: External ear normal.   Left Ear: External ear normal.   Nose: Nose normal.   Mouth/Throat: No oropharyngeal exudate.   Posterior pharynx is mildly red.    Eyes: Right eye exhibits no discharge. Left eye exhibits discharge.   Neck: Normal range of motion. Neck supple.   Cardiovascular: Normal rate and regular rhythm.  Exam reveals friction rub. Exam reveals no gallop.    No murmur heard.  Pulmonary/Chest: Effort normal and breath sounds normal. She has no wheezes.   Lymphadenopathy:     She has no cervical adenopathy.   Neurological: She is alert and oriented to person, place, and time.   Skin: Skin is warm and dry. No rash noted.   Nursing note and vitals reviewed.

## 2021-06-11 NOTE — PROGRESS NOTES
Outpatient Mental Health Treatment Plan     Name:  Tanna Live  :  1985  MRN:  [id number]     Treatment Plan:  Updated Treatment Plan  Intake/initial treatment plan date:  2/3/17, 17 (update)  Benefit and risks and alternatives have been discussed: Yes  Is this treatment appropriate with minimal intrusion/restrictions: Yes  Estimated duration of treatment:  Approximately 6-8 sessions.  Anticipated frequency of services:  Every 2 weeks  Necessity for frequency: This frequency is needed to establish therapeutic goals and for continuity of care in order to monitor progress.  Necessity for treatment: To address cognitive, behavioral, and/or emotional barriers in order to work toward goals and to improve quality of life.     Plan:                           ? Depression                                     Goal:              Decrease average depression level from 3/4 to 1/4.                        Strategies:                 ?[x] Decrease social isolation                                                                    [x] Increase involvement in meaningful activities                                                                    ?[x] Discuss sleep hygiene                                                                    ?[x] Explore thoughts and expectations about self and others                                                                    ?[x] Process grief (loss of significant person, independence, role, etc.)                                                                    ?[x] Assess for suicide risk                                                                    ?[x] Implement physical activity routine (with physician approval)                                                                    [x] Consider introduction of bibliotherapy and/or videos                                                                    [x] Continue compliance with medical treatment plan (or explore  barriers)                                                                       ?  ?Degree to which this is a problem: 2  Degree to which goal is met: goal in progress  Date of Review: in 3 months.                                                                                                                                         ?                        ? Anxiety                                            Goal:              Decrease average anxiety level from 3/4 to 1/4.                        Strategies:                 ? [x]Learn and practice relaxation techniques and other coping strategies (e.g., thought stopping, reframing, meditation)                                                                    ? [x] Increase involvement in meaningful activities                                                                    ? [x] Discuss sleep hygiene                                                                    ? [x] Explore thoughts and expectations about self and others                                                                    ? [x] Identify and monitor triggers for panic/anxiety symptoms                                                                    ? [x] Implement physical activity routine (with physician approval)                                                                    ? [x] Consider introduction of bibliotherapy and/or videos                                                                    ? [x] Continue compliance with medical treatment plan (or explore barriers)                                                          []      Degree to which this is a problem: 4  Degree to which goal is met: goal in progress  Date of Review: in 3 months         Functional Impairment:  1=Not at all/Rarely  2=Some days  3=Most Days  4=Every Day    Personal : 3  Family : 1  Social : 2   Work/school : 2     Diagnosis:  Adjustment Disorder with mixed anxiety and depressed mood        WHODAS 2.0  "12-item version  WHODAS: 9     Scores presented in qualifiers to represent level of disability.  MILD Problem (slight, low, ...) 5-24%     H1= 5  H2= 0  H3= 0     Clinical assessments and measures completed:. CARLYN-7, PHQ-9, CAGE-AID and PANSI      Strengths:  Tanna is intelligent,intuitive, direct, caring, advocates for loved ones, has natural leadership skills, and is guided in life by a strong sense of fairness.  Limitations:  Tanna can be very tough on self, including having high expectations for herself.  Cultural Considerations: She grew up the youngest of three siblings in [place]. She identifies family/ethnic cultural messages of \"don't get bogged down by the snowstorm\" and that it is important to \"push through inner turmoil.\"     Persons responsible for this plan: Patient                 Psychotherapist Signature                                               Patient Signature:                                                                                                                                                             Guardian Signature                                                                                                                                                           Provider: Performed and documented by BOGDAN Ppoe   Date:  6/1/2017        This note was created with help of Dragon dictation software.  Grammatical / typing errors are not intentional and inherent to the software.                "

## 2021-06-11 NOTE — PROGRESS NOTES
Mental Health Visit Note    6/15/2017    Start time: 1:04 PM    Stop Time: 2:02 PM   Session # 8    Tanna Live is a 32 y.o. female is being seen today for    Chief Complaint   Patient presents with     Follow-up     Depression     Anxiety   .     New symptoms or complaints: None    Functional Impairment:   Personal: 3  Family: 2  Work: 2  Social:2    Clinical assessment of mental status: Tanna presented on time for her appointment. She was oriented x3, open and cooperative, and dressed appropriately for this session and weather. Her memory was Normal cognitive functioning . Her speech was within normal. Language was appropriate to discussion. Concentration and focus is within normal. Psychosis is not noted nor reported. Her mood is dysphoric. Affect is congruent with mood, tearful at times, appropriate to subject. Fund of knowledge is adequate. Insight is adequate for therapy.      Suicidal/Homicidal Ideation present: None Reported This Session    Patient's impression of their current status: Patient reflected on the recent anniversary of learning of her brother's death.  She noticed it was helpful to be with a close friend and go to work that particular day.  She reflected on the stages of grief, resonating with each of them, and considered that it will be helpful to spend some more time with her family this summer.  She reflected on the values that are most meaningful to her, including family, connection with others, and personal growth.    Therapist impression of patients current state: Patient is engaging in the therapeutic process. Her thoughts, feelings, and beliefs were processed. She is willing to explore strategies for improved symptom management, and was willing to look at the components of wellbeing, particularly fusion/defusion from unhelpful thoughts. She is insightful.    Type of psychotherapeutic technique provided: Insight oriented, Client centered, Solution-focused, CBT and grief  support    Progress toward short term goals:Progress as expected, patient is receptive to grief support and strategies for symptom management/improvement as she is in an important life adjustment.    Review of long term goals: Patient is making progress on her long-term goals.    Diagnosis:   1. Adjustment disorder with mixed anxiety and depressed mood        Plan and Follow up: Patient encouraged to be honoring of herself - seek out her support system, show herself expert care. Patient plans to continue to practice noticing her thoughts to build capacity to recognize thought patterns that are not helpful and awareness of thought-feeling connection while identifying and committing to behaviors that improve her mood. Encouraged patient to continue to nurture a daily mindfulness breathing exercise. She plans to try using defusion strategies in the presence of unhelpful thoughts.  Patient has information from previous session on grief support groups in the community.  Plans to return for follow up in two weeks.       Discharge Criteria/Planning: Patient will continue with follow-up until therapy can be discontinued without return of signs and symptoms.    Carrie Contreras 6/15/2017      This note was created with help of Dragon dictation software. Grammatical / typing errors are not intentional and inherent to the software.

## 2021-06-11 NOTE — PROGRESS NOTES
"Mental Health Visit Note    6/1/2017    Start time: 1:06 PM    Stop Time: 2:05 PM   Session # 7    Tanna Live is a 32 y.o. female is being seen today for    Chief Complaint   Patient presents with     Follow-up     Depression     Anxiety   .     New symptoms or complaints: None    Functional Impairment:   Personal: 2  Family: 2  Work: 2  Social:2    Clinical assessment of mental status: Tanna presented on time for her appointment. She was oriented x3, open and cooperative, and dressed appropriately for this session and weather. Her memory was Normal cognitive functioning . Her speech was within normal. Language was appropriate to discussion. Concentration and focus is within normal. Psychosis is not noted nor reported. Her mood is euthymic. Affect is congruent with mood, tearful at times, appropriate to subject. Fund of knowledge is adequate. Insight is adequate for therapy.      Suicidal/Homicidal Ideation present: None Reported This Session    Patient's impression of their current status: Patient stated, \"I had a really great weekend camping with friends and hearing music.\"  She reflected on how thoughts of her brother connected to certain moments of the trip were more a source of comfort than of sadness.  She is anticipating another upcoming first anniversary without her brother related to the day her family was notified of details of his death.  She discussed what may be helpful for her for this anniversary date, including connecting to her support system.  She and her boyfriend broke up recently, something that she considers to be a good decision due to the ways he has not been able to be present to her and his substance use.  She recognized that her friends have been very supportive toward her with this decision, as well.  She reflected on the values that are most meaningful to her, including family, connection with others, and personal growth.    Therapist impression of patients current state: " Patient is engaging in the therapeutic process. Her thoughts, feelings, and beliefs were processed. She is willing to explore strategies for improved symptom management, and was willing to look at the components of wellbeing, particularly fusion/defusion from unhelpful thoughts. She is insightful.    Type of psychotherapeutic technique provided: Insight oriented, Client centered, Solution-focused, CBT and grief support    Progress toward short term goals:Progress as expected, patient is receptive to grief support and strategies for symptom management/improvement as she is in an important life adjustment.    Review of long term goals: Treatment Plan updated    Diagnosis:   1. Adjustment disorder with mixed anxiety and depressed mood        Plan and Follow up: Patient encouraged to be honoring of herself - seek out her support system, show herself expert care. Patient plans to continue to practice noticing her thoughts to build capacity to recognize thought patterns that are not helpful and awareness of thought-feeling connection while identifying and committing to behaviors that improve her mood. Encouraged patient to continue to nurture a daily mindfulness breathing exercise. She plans to try using defusion strategies in the presence of unhelpful thoughts.  Patient has information from previous session on grief support groups in the community.  Plans to return for follow up in two weeks.       Discharge Criteria/Planning: Patient will continue with follow-up until therapy can be discontinued without return of signs and symptoms.    Carrie Contreras 6/1/2017      This note was created with help of Dragon dictation software. Grammatical / typing errors are not intentional and inherent to the software.

## 2021-06-12 NOTE — PROGRESS NOTES
Mental Health Visit Note    7/21/17   Start time: 1:05 PM    Stop Time: 2:024PM   Session # 9    Tanna Live is a 32 y.o. female is being seen today for    Chief Complaint   Patient presents with     Follow-up     Anxiety     Depression   .     New symptoms or complaints: None    Functional Impairment:   Personal: 2  Family: 2  Work: 2  Social:2    Clinical assessment of mental status: Tanna presented on time for her appointment. She was oriented x3, open and cooperative, and dressed appropriately for this session and weather. Her memory was Normal cognitive functioning . Her speech was within normal. Language was appropriate to discussion. Concentration and focus is within normal. Psychosis is not noted nor reported. Her mood is euthymic.  Affect is congruent with mood. Fund of knowledge is adequate. Insight is adequate for therapy.      Suicidal/Homicidal Ideation present: None Reported This Session    Patient's impression of their current status: Patient described feeling better lately and considered that it has been helpful to spend time with family and friends this summer.  She continues to reflect on the loss of her brother and the impact on her family.  She discussed her work environment and a recent promotion to .  She reflected on the values that are most meaningful to her, including family, connection with others, and personal growth.    Therapist impression of patients current state: Patient is engaging in the therapeutic process. Her thoughts, feelings, and beliefs were processed. She is willing to explore strategies for improved symptom management, and was willing to look at the components of wellbeing, particularly fusion/defusion from unhelpful thoughts. She is insightful.    Type of psychotherapeutic technique provided: Insight oriented, Client centered, Solution-focused, CBT and grief support    Progress toward short term goals:Progress as expected, patient is receptive to  grief support and strategies for symptom management/improvement as she is in an important life adjustment.    Review of long term goals: Patient is making progress on her long-term goals.    Diagnosis:   1. Adjustment disorder with mixed anxiety and depressed mood        Plan and Follow up: Patient encouraged to be honoring of herself - seek out her support system, show herself expert care. Patient plans to continue to practice noticing her thoughts to build capacity to recognize thought patterns that are not helpful and awareness of thought-feeling connection while identifying and committing to behaviors that improve her mood. Encouraged patient to continue to nurture a daily mindfulness breathing exercise. She plans to try using defusion strategies in the presence of unhelpful thoughts.  Patient has information from previous session on grief support groups in the community.  Plans to return for follow up in in 3 weeks, or as needed.       Discharge Criteria/Planning: Patient will continue with follow-up until therapy can be discontinued without return of signs and symptoms.    Carrie Contreras 7/23/2017      This note was created with help of Dragon dictation software. Grammatical / typing errors are not intentional and inherent to the software.

## 2021-06-12 NOTE — PROGRESS NOTES
"Mental Health Visit Note    8/24/17   Start time: 11:00 AM    Stop Time: 11:59 AM   Session # 10    Tanna Live is a 32 y.o. female is being seen today for    Chief Complaint   Patient presents with     Follow-up     Anxiety     Depression   .     New symptoms or complaints: None    Functional Impairment:   Personal: 2  Family: 2  Work: 4  Social:2    Clinical assessment of mental status: Tanna presented on time for her appointment. She was oriented x3, open and cooperative, and dressed appropriately for this session and weather. Her memory was Normal cognitive functioning . Her speech was within normal. Language was appropriate to discussion. Concentration and focus is within normal. Psychosis is not noted nor reported. Her mood is dysphoric.  Affect is congruent with mood. Fund of knowledge is adequate. Insight is adequate for therapy.      Suicidal/Homicidal Ideation present: None Reported This Session    Patient's impression of their current status: Patient stated, \"Work is so stressful.  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  She described a difficult dynamic with the owner and discussed the challenges of working in an environment where trust is lacking.  She finds it helpful to spend time with family and friends during days off.  She continues to reflect on the loss of her brother and the impact on her family.  She reflected on the values that are most meaningful to her, including family, connection with others, and personal growth.    Therapist impression of patients current state: Patient is engaging in the therapeutic process. Her thoughts, feelings, and beliefs were processed. She is willing to explore strategies for improved symptom management, and was willing to look at the components of wellbeing, particularly fusion/defusion from unhelpful thoughts. She is insightful.    Type of psychotherapeutic technique provided: Insight " oriented, Client centered, Solution-focused, CBT and grief support    Progress toward short term goals:Progress as expected, patient is receptive to grief support and strategies for symptom management/improvement as she is in an important life adjustment.    Review of long term goals: Patient is making progress on her long-term goals.    Diagnosis:   1. Adjustment disorder with mixed anxiety and depressed mood        Plan and Follow up: Patient encouraged to be honoring of herself - seek out her support system, show herself expert care. Patient plans to continue to practice noticing her thoughts to build capacity to recognize thought patterns that are not helpful and awareness of thought-feeling connection while identifying and committing to behaviors that improve her mood. Encouraged patient to continue to nurture a daily mindfulness breathing exercise. She plans to try using defusion strategies in the presence of unhelpful thoughts.  Plans to return for follow up in in 3 weeks, or as needed.       Discharge Criteria/Planning: Patient will continue with follow-up until therapy can be discontinued without return of signs and symptoms.    Carrie Contreras 8/24/2017      This note was created with help of Dragon dictation software. Grammatical / typing errors are not intentional and inherent to the software.

## 2021-06-13 NOTE — PROGRESS NOTES
Outpatient Mental Health Treatment Plan      Name:  Tanna Live  :  1985  MRN:  [id number]      Treatment Plan:  Updated Treatment Plan  Intake/initial treatment plan date:  2/3/17 (initial), 17 and 17(updates)  Benefit and risks and alternatives have been discussed: Yes  Is this treatment appropriate with minimal intrusion/restrictions: Yes  Estimated duration of treatment:  Approximately 6-8 sessions.  Anticipated frequency of services:  Every 3-4 weeks  Necessity for frequency: This frequency is needed to establish and maintain therapeutic goals and for continuity of care in order to monitor progress.  Necessity for treatment: To address cognitive, behavioral, and/or emotional barriers in order to work toward goals and to improve quality of life.      Plan:      ? Depression                                     Goal:              Decrease average depression level from 2/4 to 1/4.                        Strategies:                 ?[x] Decrease social isolation                                                                    [x] Increase involvement in meaningful activities                                                                    ?[x] Discuss sleep hygiene                                                                    ?[x] Explore thoughts and expectations about self and others                                                                    ?[x] Process grief (loss of significant person, independence, role, etc.)                                                                    ?[x] Assess for suicide risk                                                                    ?[x] Implement physical activity routine (with physician approval)                                                                    [x] Consider introduction of bibliotherapy and/or videos                                                                    [x] Continue compliance with medical treatment  plan (or explore barriers)                                                                        ?  ?Degree to which this is a problem: 2  Degree to which goal is met: goal in progress, progress noted.  Date of Review: in 3 months.                                                                                                                                          ?                        ? Anxiety                                            Goal:              Decrease average anxiety level from 2/4 to 1/4.                        Strategies:                 ? [x]Learn and practice relaxation techniques and other coping strategies (e.g., thought stopping, reframing, meditation)                                                                    ? [x] Increase involvement in meaningful activities                                                                    ? [x] Discuss sleep hygiene                                                                    ? [x] Explore thoughts and expectations about self and others                                                                    ? [x] Identify and monitor triggers for panic/anxiety symptoms                                                                    ? [x] Implement physical activity routine (with physician approval)                                                                    ? [x] Consider introduction of bibliotherapy and/or videos                                                                    ? [x] Continue compliance with medical treatment plan (or explore barriers)                                                          []       Degree to which this is a problem: 3  Degree to which goal is met: goal in progress, progress noted.  Date of Review: in 3 months          Functional Impairment:  1=Not at all/Rarely  2=Some days  3=Most Days  4=Every Day    Personal : 3  Family : 1  Social : 2   Work/school : 2      Diagnosis:  Adjustment Disorder  "with mixed anxiety and depressed mood          WHODAS 2.0 12-item version  WHODAS: 9      Scores presented in qualifiers to represent level of disability.  MILD Problem (slight, low, ...) 5-24%      H1= 5  H2= 0  H3= 0      Clinical assessments and measures completed:. CARLYN-7, PHQ-9, CAGE-AID and PANSI      Strengths:  Tanna is intelligent,intuitive, direct, caring, advocates for loved ones, has natural leadership skills, and is guided in life by a strong sense of fairness.  Limitations:  Tanna can be very tough on self, including having high expectations for herself.  Cultural Considerations: She grew up the youngest of three siblings in [place]. She identifies family/ethnic cultural messages of \"don't get bogged down by the snowstorm\" and that it is important to \"push through inner turmoil.\"      Persons responsible for this plan: Patient                      Psychotherapist Signature                                                Patient Signature:                                                                                                                                                               Guardian Signature                                                                                                                                                             Provider: Performed and documented by AVRIL Pope   Date:  9/28/2017          This note was created with help of Dragon dictation software.  Grammatical / typing errors are not intentional and inherent to the software.      "

## 2021-06-13 NOTE — PROGRESS NOTES
"Mental Health Visit Note    10/26/17   Start time: 1:07 PM    Stop Time: 2:05 PM   Session # 12    Tanna Live is a 32 y.o. female is being seen today for    Chief Complaint   Patient presents with     Follow-up     Depression     Anxiety   .     New symptoms or complaints: None    Functional Impairment:   Personal: 2  Family: 2  Work: 4  Social:2    Clinical assessment of mental status: Tanna presented on time for her appointment. She was oriented x3, open and cooperative, and dressed appropriately for this session and weather. Her memory was Normal cognitive functioning . Her speech was within normal. Language was appropriate to discussion. Concentration and focus is within normal. Psychosis is not noted nor reported. Her mood is euthymic.  Affect is congruent with mood. Fund of knowledge is adequate. Insight is adequate for therapy.      Suicidal/Homicidal Ideation present: None Reported This Session    Patient's impression of their current status: Patient discussed occupational stressors.  She identified the thought of \"I'm not a quitter\" as both helpful and at times unhelpful in the context of a dysfunctional work environment.  She reflected on the values that are most meaningful to her, including family, connection with others, achievement, creativity and personal growth.    Therapist impression of patients current state: Patient is engaging in the therapeutic process. Her thoughts, feelings, and beliefs were processed. She is willing to explore strategies for improved symptom management, and was willing to look at the components of wellbeing, particularly fusion/defusion from unhelpful thoughts. She is insightful.    Type of psychotherapeutic technique provided: Insight oriented, Client centered, Solution-focused, CBT and grief support    Progress toward short term goals:Progress as expected, patient is receptive to grief support and strategies for symptom management/improvement as she is in an " important life adjustment.    Review of long term goals: Patient is making progress on her long-term goals.    Diagnosis:   1. Adjustment disorder with mixed anxiety and depressed mood        Plan and Follow up: Patient encouraged to be honoring of herself - seek out her support system, show herself expert care. Patient plans to continue to practice noticing her thoughts to build capacity to recognize thought patterns that are not helpful and awareness of thought-feeling connection while identifying and committing to behaviors that improve her mood. Encouraged patient to continue to nurture a daily mindfulness breathing exercise. She plans to try using defusion strategies in the presence of unhelpful thoughts.  Plans to return for follow up in in 3 weeks, or as needed.       Discharge Criteria/Planning: Patient will continue with follow-up until therapy can be discontinued without return of signs and symptoms.    Carrie Contreras 10/27/2017      This note was created with help of Dragon dictation software. Grammatical / typing errors are not intentional and inherent to the software.

## 2021-06-13 NOTE — PROGRESS NOTES
Assessment/Plan:        Diagnoses and all orders for this visit:    Strep throat  -     Rapid Strep A Screen-Throat    Other orders  -     amoxicillin (AMOXIL) 500 MG tablet; Take 1 tablet (500 mg total) by mouth 3 (three) times a day for 10 days. May substitute capsules  Dispense: 30 tablet; Refill: 0         Continue with warm fluids and salt water gargles.  Follow-up in 3-4 days if no improvement or if any change in symptoms.    Subjective:    Patient ID: Tanna Live is a 32 y.o. female.    Sore Throat    This is a new problem. The current episode started in the past 7 days. The problem has been gradually worsening. Neither side of throat is experiencing more pain than the other. There has been no fever. The pain is at a severity of 8/10. The pain is severe. Associated symptoms include coughing, ear pain, headaches, swollen glands and trouble swallowing. Pertinent negatives include no abdominal pain, ear discharge, plugged ear sensation or shortness of breath. She has had no exposure to strep. She has tried NSAIDs and acetaminophen for the symptoms. The treatment provided mild relief.       The following portions of the patient's history were reviewed and updated as appropriate: allergies, current medications, past family history, past medical history, past social history, past surgical history and problem list.    Review of Systems   Constitutional: Positive for fatigue. Negative for chills, diaphoresis and fever.   HENT: Positive for ear pain, sinus pain, sinus pressure, sore throat and trouble swallowing. Negative for ear discharge, facial swelling and rhinorrhea.    Respiratory: Positive for cough and wheezing. Negative for shortness of breath.    Gastrointestinal: Negative for abdominal pain.   Skin: Negative for rash.   Neurological: Positive for headaches.   All other systems reviewed and are negative.            Objective:    Physical Exam   Constitutional: She is oriented to person, place, and  time. She appears well-developed and well-nourished. No distress.   HENT:   Right Ear: External ear normal.   Left Ear: External ear normal.   Nose: Nose normal.   Mouth/Throat: Oropharyngeal exudate present.   Posterior pharynx is significantly red bilaterally. +exudate.  No adenopathy.  She has anterior cervical adenopathy bilaterally.  No posterior cervical adenopathy.   Eyes: Conjunctivae are normal.   Neck: Normal range of motion. Neck supple.   Cardiovascular: Normal rate, regular rhythm and normal heart sounds.  Exam reveals no gallop and no friction rub.    No murmur heard.  Pulmonary/Chest: Breath sounds normal. She is in respiratory distress. She has no wheezes. She has no rales.   Lymphadenopathy:     She has cervical adenopathy.   Neurological: She is alert and oriented to person, place, and time.   Skin: Skin is warm and dry.   Nursing note and vitals reviewed.

## 2021-06-13 NOTE — PROGRESS NOTES
Mental Health Visit Note    9/28/17   Start time: 1:05 PM    Stop Time: 2:04 PM   Session # 11    Tanna Live is a 32 y.o. female is being seen today for    Chief Complaint   Patient presents with     Follow-up     Anxiety     Depression   .     New symptoms or complaints: None    Functional Impairment:   Personal: 2  Family: 2  Work: 4  Social:2    Clinical assessment of mental status: Tanna presented on time for her appointment. She was oriented x3, open and cooperative, and dressed appropriately for this session and weather. Her memory was Normal cognitive functioning . Her speech was within normal. Language was appropriate to discussion. Concentration and focus is within normal. Psychosis is not noted nor reported. Her mood is euthymic.  Affect is congruent with mood. Fund of knowledge is adequate. Insight is adequate for therapy.      Suicidal/Homicidal Ideation present: None Reported This Session    Patient's impression of their current status: Patient discussed a stressful work dynamic and decision making that she is facing as another work opportunity is presenting itself.  She identified the thoughts and feelings that arise in this context.  She reflected on the values that are most meaningful to her, including family, connection with others, achievement, creativity and personal growth.    Therapist impression of patients current state: Patient is engaging in the therapeutic process. Her thoughts, feelings, and beliefs were processed. She is willing to explore strategies for improved symptom management, and was willing to look at the components of wellbeing, particularly fusion/defusion from unhelpful thoughts. She is insightful.    Type of psychotherapeutic technique provided: Insight oriented, Client centered, Solution-focused, CBT and grief support    Progress toward short term goals:Progress as expected, patient is receptive to grief support and strategies for symptom management/improvement as  she is in an important life adjustment.    Review of long term goals: Treatment Plan updated    Diagnosis:   1. Adjustment disorder with mixed anxiety and depressed mood        Plan and Follow up: Patient encouraged to be honoring of herself - seek out her support system, show herself expert care. Patient plans to continue to practice noticing her thoughts to build capacity to recognize thought patterns that are not helpful and awareness of thought-feeling connection while identifying and committing to behaviors that improve her mood. Encouraged patient to continue to nurture a daily mindfulness breathing exercise. She plans to try using defusion strategies in the presence of unhelpful thoughts.  Plans to return for follow up in in 3 weeks, or as needed.       Discharge Criteria/Planning: Patient will continue with follow-up until therapy can be discontinued without return of signs and symptoms.    Carrie Contreras 9/28/2017      This note was created with help of Dragon dictation software. Grammatical / typing errors are not intentional and inherent to the software.

## 2021-06-14 NOTE — PROGRESS NOTES
Mental Health Visit Note    11/30/17   Start time: 11:01 AM    Stop Time: 12:00 PM   Session # 13    Tanna Live is a 32 y.o. female is being seen today for    Chief Complaint   Patient presents with     Follow-up     Anxiety     Depression   .     New symptoms or complaints: None    Functional Impairment:   Personal: 2  Family: 2  Work: 3  Social:1    Clinical assessment of mental status: Tanna presented on time for her appointment. She was oriented x3, open and cooperative, and dressed appropriately for this session and weather. Her memory was Normal cognitive functioning . Her speech was within normal. Language was appropriate to discussion. Concentration and focus is within normal. Psychosis is not noted nor reported. Her mood is euthymic.  Affect is congruent with mood. Fund of knowledge is adequate. Insight is adequate for therapy.      Suicidal/Homicidal Ideation present: None Reported This Session    Patient's impression of their current status: Patient recently left her job.  She reported to work for a work picture, clocked in and was then approached by her supervisor who was critical of her clocking in and suggested she consider leaving her employment there.  The patient reflected on the bullying behavior by her supervisor which led to her decision that it was healthiest for her to leave.  She described feeling tremendous relief in the couple of weeks since ending her employment, noting that it has been helpful to get adequate rest and engage in creative outlets that she has neglected in the time she has held her previous job.  She identified some anxious thoughts around this period of unemployment and has engaged in a job search.  She acknowledged that she feels ready to engage in a grief support group now that she is available on Monday evenings (the time when the [place] grief support group meets). She reflected on the values that are most meaningful to her, including family, connection with  others, achievement, creativity and personal growth.    Therapist impression of patients current state: Patient is engaging in the therapeutic process. Her thoughts, feelings, and beliefs were processed. She is willing to explore strategies for improved symptom management, and was willing to look at the components of wellbeing, particularly fusion/defusion from unhelpful thoughts. She is insightful.    Type of psychotherapeutic technique provided: Insight oriented, Client centered, Solution-focused, CBT and grief support    Progress toward short term goals:Progress as expected, patient is receptive to grief support and strategies for symptom management/improvement as she is in an important life adjustment.    Review of long term goals: Patient is making progress on her long-term goals.    Diagnosis:   1. Adjustment disorder with mixed anxiety and depressed mood        Plan and Follow up: Patient encouraged to continue to be honoring of herself - seek out her support system, show herself expert care (including nurturing a mindfulness breathing practice and engaging in daily physical activity). Patient plans to continue to practice noticing her thoughts to build capacity to recognize thought patterns that are not helpful and awareness of thought-feeling connection while identifying and committing to behaviors that improve her mood. Provided information for a grief support group that patient expressed interest in attending now that she has a flexible schedule. Plans to return for follow up in in 3 weeks, or as needed.       Discharge Criteria/Planning: Patient will continue with follow-up until therapy can be discontinued without return of signs and symptoms.    Carrie Contreras 12/1/2017      This note was created with help of Dragon dictation software. Grammatical / typing errors are not intentional and inherent to the software.

## 2021-06-15 NOTE — PROGRESS NOTES
Mental Health Visit Note    18   Start time: 3:08 PM    Stop Time: 4:07 PM   Session # 1    Tanna Live is a 32 y.o. female is being seen today for    Chief Complaint   Patient presents with     Follow-up     Anxiety     Depression   .     New symptoms or complaints: None    Functional Impairment:   Personal: 2  Family: 1  Work: 2  Social:1    Clinical assessment of mental status: Tanna presented on time for her appointment. She was oriented x3, open and cooperative, and dressed appropriately for this session and weather. Her memory was Normal cognitive functioning . Her speech was within normal. Language was appropriate to discussion. Concentration and focus is within normal. Psychosis is not noted nor reported. Her mood is euthymic.  Affect is congruent with mood. Fund of knowledge is adequate. Insight is adequate for therapy.      Suicidal/Homicidal Ideation present: None Reported This Session    Patient's impression of their current status: Patient discussed recent death of her ex-boyfriend.  They  a few months ago and she learned from a mutual friend that he  unexpectedly last month.  She reached out to his mother and she was glad that his mother called her.  She has experienced several deaths in the last two years.  She recently started attending a grief group which she described feeling a source of support and connection.  She considered that she has benefited from reducing stress in her life - one of the most significant one was her decision to leave her job, a very difficult work culture.  She is enjoying working a different job for now, and discussed some of her life goals, including eventually moving back to [facility] where her family lives and engaging in community political initiatives to increase voting.  She reflected on the values that are most meaningful to her, including family, connection with others, health, public service, achievement, creativity and personal  growth.    Therapist impression of patients current state: Patient is engaging in the therapeutic process. Her thoughts, feelings, and beliefs were processed. She is willing to explore strategies for improved symptom management, and was willing to look at the components of wellbeing, particularly fusion/defusion from unhelpful thoughts. She is insightful.    Type of psychotherapeutic technique provided: Insight oriented, Client centered, Solution-focused, CBT and grief support    Progress toward short term goals:Progress as expected, patient is receptive to grief support and strategies for symptom management/improvement as she is in an important life adjustment.    Review of long term goals: Treatment Plan updated.  Patient is making progress on her long-term goals.     Diagnosis:   1. Adjustment disorder with mixed anxiety and depressed mood        Plan and Follow up: Patient encouraged to continue to be honoring of herself - seek out her support system, show herself expert care (including nurturing a mindfulness breathing practice and engaging in daily physical activity). Patient plans to continue to practice noticing her thoughts to build capacity to recognize thought patterns that are not helpful and awareness of thought-feeling connection while identifying and committing to behaviors that improve her mood. Plans to continue to participate in a grief support group. Plans to return for follow up in in 3 weeks, or as needed.       Discharge Criteria/Planning: Patient will continue with follow-up until therapy can be discontinued without return of signs and symptoms.    Carrie Contreras 1/11/2018

## 2021-06-16 NOTE — PROGRESS NOTES
Mental Health Visit Note    3/22/18   Start time: 11:05 AM    Stop Time: 12:03 PM   Session # 3    Tanna Live is a 32 y.o. female is being seen today for    Chief Complaint   Patient presents with     Follow-up     Anxiety     Depression   .     New symptoms or complaints: None    Functional Impairment:   Personal: 2  Family: 1  Work: 2  Social:1    Clinical assessment of mental status: Tanna presented on time for her appointment. She was oriented x3, open and cooperative, and dressed appropriately for this session and weather. Her memory was Normal cognitive functioning . Her speech was within normal. Language was appropriate to discussion. Concentration and focus is within normal. Psychosis is not noted nor reported. Her mood is euthymic.  Affect is congruent with mood, tearful at times, appropriate to subject. Fund of knowledge is adequate. Insight is adequate for therapy.      Suicidal/Homicidal Ideation present: None Reported This Session    Patient's impression of their current status: Patient recently took a trip with her family to [place], one of the first family trips taken since her brother's death.  She considered the different ways family members have approached their grief and that it was helpful to hear her mother speak of it.  She is planning to move later this spring and is looking forward to being close to family.  She reflected on the values that are most meaningful to her, including family, connection with others, health, public service, achievement, creativity and personal growth.    Therapist impression of patients current state: Patient is engaging in the therapeutic process. Her thoughts, feelings, and beliefs were processed. She is willing to explore strategies for improved symptom management, and was willing to look at the components of wellbeing, particularly fusion/defusion from unhelpful thoughts. She is very insightful.    Type of psychotherapeutic technique provided: Insight  oriented, Client centered, Solution-focused, CBT and grief support    Progress toward short term goals:Progress as expected, patient is receptive to grief support and strategies for symptom management/improvement as she is in an important life adjustment.    Review of long term goals: Patient is making progress on her long-term goals.     Diagnosis:   1. Adjustment disorder with mixed anxiety and depressed mood        Plan and Follow up: Patient encouraged to continue to be honoring of herself - seek out her support system, show herself expert care (including nurturing a mindfulness breathing practice and engaging in daily physical activity). Patient plans to continue to practice noticing her thoughts to build capacity to recognize thought patterns that are not helpful and awareness of thought-feeling connection while identifying and committing to behaviors that improve her mood. Plans to continue to participate in a grief support group. Plans to return for follow up in in 3 weeks, or as needed.       Discharge Criteria/Planning: Patient will continue with follow-up until therapy can be discontinued without return of signs and symptoms.    Carrie Contreras 3/22/2018

## 2021-06-17 NOTE — PROGRESS NOTES
"Mental Health Visit Note    4/19/18   Start time: 3:08 PM    Stop Time: 4:07 PM   Session # 4    Tanna Live is a 32 y.o. female is being seen today for    Chief Complaint   Patient presents with     Follow-up     Anxiety     Depression   .     New symptoms or complaints: None    Functional Impairment:   Personal: 3  Family: 1  Work: 1  Social:1    Clinical assessment of mental status: Tanna presented on time for her appointment. She was oriented x3, open and cooperative, and dressed appropriately for this session and weather. Her memory was Normal cognitive functioning . Her speech was within normal. Language was appropriate to discussion. Concentration and focus is within normal. Psychosis is not noted nor reported. Her mood is euthymic.  Affect is congruent with mood. Fund of knowledge is adequate. Insight is adequate for therapy.      Suicidal/Homicidal Ideation present: None Reported This Session    Patient's impression of their current status: Patient stated, \"Grief has made me settle in to and trust my feelings more,\" as she discussed the death of her brother. She recently visited a friend who knew her brother and who she has not seen since her brother's death.  She considered the impact of this loss on her and her family.  She is planning to move later this spring and is looking forward to being close to them.  She reflected on the values that are most meaningful to her, including family, connection with others, health, public service, achievement, creativity and personal growth.    Therapist impression of patients current state: Patient is engaging in the therapeutic process. Her thoughts, feelings, and beliefs were processed. She is willing to explore strategies for improved symptom management.  She is very insightful.    Type of psychotherapeutic technique provided: Insight oriented, Client centered, Solution-focused, CBT and grief support    Progress toward short term goals:Progress as expected, " patient is receptive to grief support and strategies for symptom management/improvement as she is in an important life adjustment.    Review of long term goals: Patient is making progress on her long-term goals.     Diagnosis:   1. Adjustment disorder with mixed anxiety and depressed mood        Plan and Follow up: Patient encouraged to continue to be honoring of herself - seek out her support system, show herself expert care (including nurturing a mindfulness breathing practice and engaging in daily physical activity). Patient plans to continue to practice noticing her thoughts to build capacity to recognize thought patterns that are not helpful and awareness of thought-feeling connection while identifying and committing to behaviors that improve her mood. Plans to continue to participate in a grief support group. Plans to return for follow up in in 3 weeks, or as needed.       Discharge Criteria/Planning: Patient will continue with follow-up until therapy can be discontinued without return of signs and symptoms.    Carrie Contreras 4/19/2018

## 2021-06-18 NOTE — PROGRESS NOTES
"Mental Health Visit Note    5/22/18   Start time: 1:07 PM    Stop Time: 2:06 PM   Session # 5    Tanna Live is a 33 y.o. female is being seen today for    Chief Complaint   Patient presents with     Follow-up     Anxiety     Depression   .     New symptoms or complaints: Reported no new symptoms.    Functional Impairment:   Personal: 2  Family: 1  Work: 1  Social:2    Clinical assessment of mental status: Tanna presented on time for her appointment. She was oriented x3, open and cooperative, and dressed appropriately for this session and weather. Her memory was Normal cognitive functioning . Her speech was within normal. Language was appropriate to discussion. Concentration and focus is within normal. Psychosis is not noted nor reported. Her mood is euthymic.  Affect is congruent with mood. Fund of knowledge is adequate. Insight is adequate for therapy.      Suicidal/Homicidal Ideation present: None Reported This Session    Patient's impression of their current status: Patient discussed preparations for an upcoming move to be close to her family.  She discussed a relationship and considered a tendency to feel like a \"fixer.\"  She explored expectation of self and other.  She reflected on the values that are most meaningful to her, including meaningful connection with others, relationship, family,  health, achievement, creativity and personal growth.    Therapist impression of patients current state: Patient is engaging in the therapeutic process. Her thoughts, feelings, and beliefs were processed. She is willing to explore strategies for improved symptom management.  She is very insightful.    Type of psychotherapeutic technique provided: Insight oriented, Client centered, Solution-focused, CBT and grief support    Progress toward short term goals:Progress as expected, patient is receptive to grief support and strategies for symptom management/improvement as she is in an important life adjustment.    Review " of long term goals: Treatment Plan updated    Diagnosis:   1. Adjustment disorder with mixed anxiety and depressed mood        Plan and Follow up: Patient plans to continue to connect to her support system, show herself expert care (including nurturing a mindfulness breathing practice and engaging in daily physical activity, sleep hygiene). Patient plans to continue to practice noticing her thoughts to build capacity to recognize thought patterns that are not helpful and awareness of thought-feeling connection while identifying and committing to behaviors that align with her values. Plans to continue to participate in a grief support group. Plans to return for follow up next month, or as needed.       Discharge Criteria/Planning: Patient will continue with follow-up until therapy can be discontinued without return of signs and symptoms.    Carrie Contreras 5/22/2018

## 2021-06-18 NOTE — PROGRESS NOTES
Outpatient Mental Health Treatment Plan      Name:  Tanna Live  :  1985  MRN:  [id number]      Treatment Plan:  Updated Treatment Plan  Intake/initial treatment plan date:  2/3/17 (initial), 17, 17, 18, and 18 (updates)  Benefit and risks and alternatives have been discussed: Yes  Is this treatment appropriate with minimal intrusion/restrictions: Yes  Estimated duration of treatment:  Approximately 4-5 sessions.  Anticipated frequency of services:  Every 3-4 weeks  Necessity for frequency: This frequency is needed to establish and maintain therapeutic goals and for continuity of care in order to monitor progress.  Necessity for treatment: To address cognitive, behavioral, and/or emotional barriers in order to work toward goals and to improve quality of life.      Plan:      ? Depression                                     Goal:              Decrease average depression level from 2/4 to 1/4.                        Strategies:                          ?[x] Decrease social isolation                                                                    [x] Increase involvement in meaningful activities                                                                    ?[x] Discuss sleep hygiene                                                                    ?[x] Explore thoughts and expectations about self and others                                                                    ?[x] Process grief (loss of significant person, independence, role, etc.)                                                                    ?[x] Assess for suicide risk                                                                    ?[x] Implement physical activity routine (with physician approval)                                                                    [x] Consider introduction of bibliotherapy and/or videos                                                                    [x] Continue  compliance with medical treatment plan (or explore barriers)                                                                        ?  ?Degree to which this is a problem: 2  Degree to which goal is met: goal in progress. Patient has made important progress.  Date of Review: in 3 months.                                                                                                                                          ?                        ? Anxiety                                            Goal:              Decrease average anxiety level from 1/4 to 1/4.                        Strategies:                 ?           [x]Learn and practice relaxation techniques and other coping strategies (e.g., thought stopping, reframing, meditation)                                                                    ? [x] Increase involvement in meaningful activities                                                                    ? [x] Discuss sleep hygiene                                                                    ? [x] Explore thoughts and expectations about self and others                                                                    ? [x] Identify and monitor triggers for panic/anxiety symptoms                                                                    ? [x] Implement physical activity routine (with physician approval)                                                                    ? [x] Consider introduction of bibliotherapy and/or videos                                                                    ? [x] Continue compliance with medical treatment plan (or explore barriers)                                                          []       Degree to which this is a problem: 1  Degree to which goal is met: patient has made significant progress. Goal maintenance is current goal.  Date of Review: in 3 months          Functional Impairment:  1=Not at all/Rarely  2=Some days  3=Most  "Days  4=Every Day    Personal : 2  Family : 1  Social : 1   Work/school : 1      Diagnosis:  Adjustment Disorder with mixed anxiety and depressed mood          WHODAS 2.0 12-item version  WHODAS: 9      Scores presented in qualifiers to represent level of disability.  MILD Problem (slight, low, ...) 5-24%      H1= 5  H2= 0  H3= 0      Clinical assessments and measures completed:. CARLYN-7, PHQ-9, CAGE-AID and PANSI      Strengths:  Tanna is intelligent,intuitive, direct, caring, advocates for loved ones, has natural leadership skills, and is guided in life by a strong sense of fairness.  Limitations:  Tanna can be very tough on self, including having high expectations for herself.  Cultural Considerations: She grew up the youngest of three siblings in [place]. She identifies family/ethnic cultural messages of \"don't get bogged down by the snowstorm\" and that it is important to \"push through inner turmoil.\"      Persons responsible for this plan: Patient                      Psychotherapist Signature                                                Patient Signature:                                                                                                                                                                                                                                                                                                                   Provider: Performed and documented by Carrie Contreras [facility]   Date:  5/22/2018            "

## 2023-01-18 ENCOUNTER — NURSE TRIAGE (OUTPATIENT)
Dept: NURSING | Facility: CLINIC | Age: 38
End: 2023-01-18